# Patient Record
Sex: FEMALE | Race: WHITE | Employment: OTHER | URBAN - METROPOLITAN AREA
[De-identification: names, ages, dates, MRNs, and addresses within clinical notes are randomized per-mention and may not be internally consistent; named-entity substitution may affect disease eponyms.]

---

## 2018-12-19 PROCEDURE — 81001 URINALYSIS AUTO W/SCOPE: CPT | Performed by: INTERNAL MEDICINE

## 2019-02-16 ENCOUNTER — HOSPITAL ENCOUNTER (INPATIENT)
Facility: HOSPITAL | Age: 64
LOS: 1 days | Discharge: HOME | DRG: 983 | End: 2019-02-18
Attending: EMERGENCY MEDICINE | Admitting: INTERNAL MEDICINE
Payer: COMMERCIAL

## 2019-02-16 DIAGNOSIS — R04.0 EPISTAXIS: Primary | ICD-10-CM

## 2019-02-16 LAB
BASOPHILS # BLD AUTO: 0.04 X10(3) UL (ref 0–0.2)
BASOPHILS NFR BLD AUTO: 0.5 %
DEPRECATED RDW RBC AUTO: 43.5 FL (ref 35.1–46.3)
EOSINOPHIL # BLD AUTO: 0.08 X10(3) UL (ref 0–0.7)
EOSINOPHIL NFR BLD AUTO: 0.9 %
ERYTHROCYTE [DISTWIDTH] IN BLOOD BY AUTOMATED COUNT: 12.9 % (ref 11–15)
HCT VFR BLD AUTO: 36.6 % (ref 35–48)
HGB BLD-MCNC: 12.5 G/DL (ref 12–16)
IMM GRANULOCYTES # BLD AUTO: 0.02 X10(3) UL (ref 0–1)
IMM GRANULOCYTES NFR BLD: 0.2 %
LYMPHOCYTES # BLD AUTO: 1.5 X10(3) UL (ref 1–4)
LYMPHOCYTES NFR BLD AUTO: 17.2 %
MCH RBC QN AUTO: 31.3 PG (ref 26–34)
MCHC RBC AUTO-ENTMCNC: 34.2 G/DL (ref 31–37)
MCV RBC AUTO: 91.7 FL (ref 80–100)
MONOCYTES # BLD AUTO: 0.69 X10(3) UL (ref 0.1–1)
MONOCYTES NFR BLD AUTO: 7.9 %
NEUTROPHILS # BLD AUTO: 6.38 X10 (3) UL (ref 1.5–7.7)
NEUTROPHILS # BLD AUTO: 6.38 X10(3) UL (ref 1.5–7.7)
NEUTROPHILS NFR BLD AUTO: 73.3 %
PLATELET # BLD AUTO: 231 10(3)UL (ref 150–450)
RBC # BLD AUTO: 3.99 X10(6)UL (ref 3.8–5.3)
WBC # BLD AUTO: 8.7 X10(3) UL (ref 4–11)

## 2019-02-16 PROCEDURE — 2Y41X5Z PACKING OF NASAL REGION USING PACKING MATERIAL: ICD-10-PCS | Performed by: OTOLARYNGOLOGY

## 2019-02-16 RX ORDER — LORATADINE 10 MG/1
10 TABLET ORAL DAILY
COMMUNITY
End: 2019-11-20 | Stop reason: ALTCHOICE

## 2019-02-16 RX ORDER — LORAZEPAM 2 MG/ML
1 INJECTION INTRAMUSCULAR ONCE
Status: COMPLETED | OUTPATIENT
Start: 2019-02-16 | End: 2019-02-16

## 2019-02-16 RX ORDER — ONDANSETRON 4 MG/1
4 TABLET, ORALLY DISINTEGRATING ORAL ONCE
Status: DISCONTINUED | OUTPATIENT
Start: 2019-02-16 | End: 2019-02-18

## 2019-02-17 ENCOUNTER — ANESTHESIA (OUTPATIENT)
Dept: INTERVENTIONAL RADIOLOGY/VASCULAR | Facility: HOSPITAL | Age: 64
End: 2019-02-17

## 2019-02-17 ENCOUNTER — APPOINTMENT (OUTPATIENT)
Dept: INTERVENTIONAL RADIOLOGY/VASCULAR | Facility: HOSPITAL | Age: 64
DRG: 983 | End: 2019-02-17
Attending: RADIOLOGY
Payer: COMMERCIAL

## 2019-02-17 ENCOUNTER — ANESTHESIA EVENT (OUTPATIENT)
Dept: INTERVENTIONAL RADIOLOGY/VASCULAR | Facility: HOSPITAL | Age: 64
End: 2019-02-17

## 2019-02-17 LAB
ANION GAP SERPL CALC-SCNC: 7 MMOL/L (ref 0–18)
APTT PPP: 29.1 SECONDS (ref 26.1–34.6)
BASOPHILS # BLD AUTO: 0.02 X10(3) UL (ref 0–0.2)
BASOPHILS NFR BLD AUTO: 0.3 %
BUN BLD-MCNC: 19 MG/DL (ref 7–18)
BUN/CREAT SERPL: 22.4 (ref 10–20)
CALCIUM BLD-MCNC: 7.7 MG/DL (ref 8.5–10.1)
CHLORIDE SERPL-SCNC: 117 MMOL/L (ref 98–107)
CO2 SERPL-SCNC: 23 MMOL/L (ref 21–32)
CREAT BLD-MCNC: 0.85 MG/DL (ref 0.55–1.02)
DEPRECATED RDW RBC AUTO: 45.8 FL (ref 35.1–46.3)
EOSINOPHIL # BLD AUTO: 0 X10(3) UL (ref 0–0.7)
EOSINOPHIL NFR BLD AUTO: 0 %
ERYTHROCYTE [DISTWIDTH] IN BLOOD BY AUTOMATED COUNT: 13 % (ref 11–15)
GLUCOSE BLD-MCNC: 127 MG/DL (ref 70–99)
GLUCOSE BLD-MCNC: 143 MG/DL (ref 70–99)
HAV IGM SER QL: 1.9 MG/DL (ref 1.6–2.6)
HCT VFR BLD AUTO: 30.7 % (ref 35–48)
HGB BLD-MCNC: 9.5 G/DL (ref 12–16)
IMM GRANULOCYTES # BLD AUTO: 0.02 X10(3) UL (ref 0–1)
IMM GRANULOCYTES NFR BLD: 0.3 %
INR BLD: 1.27 (ref 0.9–1.1)
INR BLD: 1.34 (ref 0.9–1.1)
LYMPHOCYTES # BLD AUTO: 1.21 X10(3) UL (ref 1–4)
LYMPHOCYTES NFR BLD AUTO: 15.5 %
MCH RBC QN AUTO: 29.7 PG (ref 26–34)
MCHC RBC AUTO-ENTMCNC: 30.9 G/DL (ref 31–37)
MCV RBC AUTO: 95.9 FL (ref 80–100)
MONOCYTES # BLD AUTO: 0.57 X10(3) UL (ref 0.1–1)
MONOCYTES NFR BLD AUTO: 7.3 %
NEUTROPHILS # BLD AUTO: 6 X10 (3) UL (ref 1.5–7.7)
NEUTROPHILS # BLD AUTO: 6 X10(3) UL (ref 1.5–7.7)
NEUTROPHILS NFR BLD AUTO: 76.6 %
OSMOLALITY SERPL CALC.SUM OF ELEC: 309 MOSM/KG (ref 275–295)
PLATELET # BLD AUTO: 207 10(3)UL (ref 150–450)
POTASSIUM SERPL-SCNC: 4.2 MMOL/L (ref 3.5–5.1)
PSA SERPL DL<=0.01 NG/ML-MCNC: 16.4 SECONDS (ref 12.4–14.7)
PSA SERPL DL<=0.01 NG/ML-MCNC: 17.1 SECONDS (ref 12.4–14.7)
RBC # BLD AUTO: 3.2 X10(6)UL (ref 3.8–5.3)
SODIUM SERPL-SCNC: 147 MMOL/L (ref 136–145)
WBC # BLD AUTO: 7.8 X10(3) UL (ref 4–11)

## 2019-02-17 PROCEDURE — B31Q1ZZ FLUOROSCOPY OF CERVICO-CEREBRAL ARCH USING LOW OSMOLAR CONTRAST: ICD-10-PCS | Performed by: RADIOLOGY

## 2019-02-17 PROCEDURE — 99291 CRITICAL CARE FIRST HOUR: CPT | Performed by: OTHER

## 2019-02-17 PROCEDURE — 03LY3DZ OCCLUSION OF UPPER ARTERY WITH INTRALUMINAL DEVICE, PERCUTANEOUS APPROACH: ICD-10-PCS | Performed by: RADIOLOGY

## 2019-02-17 RX ORDER — ACETAMINOPHEN 325 MG/1
650 TABLET ORAL EVERY 4 HOURS PRN
Status: DISCONTINUED | OUTPATIENT
Start: 2019-02-17 | End: 2019-02-18

## 2019-02-17 RX ORDER — ONDANSETRON 2 MG/ML
4 INJECTION INTRAMUSCULAR; INTRAVENOUS EVERY 6 HOURS PRN
Status: DISCONTINUED | OUTPATIENT
Start: 2019-02-17 | End: 2019-02-18

## 2019-02-17 RX ORDER — DIPHENHYDRAMINE HYDROCHLORIDE 50 MG/ML
12.5 INJECTION INTRAMUSCULAR; INTRAVENOUS AS NEEDED
Status: DISCONTINUED | OUTPATIENT
Start: 2019-02-17 | End: 2019-02-17 | Stop reason: HOSPADM

## 2019-02-17 RX ORDER — METOCLOPRAMIDE HYDROCHLORIDE 5 MG/ML
10 INJECTION INTRAMUSCULAR; INTRAVENOUS AS NEEDED
Status: DISCONTINUED | OUTPATIENT
Start: 2019-02-17 | End: 2019-02-17 | Stop reason: HOSPADM

## 2019-02-17 RX ORDER — METOCLOPRAMIDE HYDROCHLORIDE 5 MG/ML
10 INJECTION INTRAMUSCULAR; INTRAVENOUS EVERY 6 HOURS PRN
Status: DISCONTINUED | OUTPATIENT
Start: 2019-02-17 | End: 2019-02-18

## 2019-02-17 RX ORDER — LIDOCAINE HYDROCHLORIDE 10 MG/ML
INJECTION, SOLUTION INFILTRATION; PERINEURAL
Status: COMPLETED
Start: 2019-02-17 | End: 2019-02-17

## 2019-02-17 RX ORDER — SCOLOPAMINE TRANSDERMAL SYSTEM 1 MG/1
1 PATCH, EXTENDED RELEASE TRANSDERMAL
Status: DISCONTINUED | OUTPATIENT
Start: 2019-02-17 | End: 2019-02-18

## 2019-02-17 RX ORDER — AMOXICILLIN AND CLAVULANATE POTASSIUM 875; 125 MG/1; MG/1
1 TABLET, FILM COATED ORAL EVERY 12 HOURS SCHEDULED
Status: DISCONTINUED | OUTPATIENT
Start: 2019-02-17 | End: 2019-02-18

## 2019-02-17 RX ORDER — NALOXONE HYDROCHLORIDE 0.4 MG/ML
80 INJECTION, SOLUTION INTRAMUSCULAR; INTRAVENOUS; SUBCUTANEOUS AS NEEDED
Status: DISCONTINUED | OUTPATIENT
Start: 2019-02-17 | End: 2019-02-17 | Stop reason: HOSPADM

## 2019-02-17 RX ORDER — HYDROCODONE BITARTRATE AND ACETAMINOPHEN 10; 325 MG/1; MG/1
1 TABLET ORAL EVERY 6 HOURS PRN
Status: DISCONTINUED | OUTPATIENT
Start: 2019-02-17 | End: 2019-02-18

## 2019-02-17 RX ORDER — LABETALOL HYDROCHLORIDE 5 MG/ML
5 INJECTION, SOLUTION INTRAVENOUS EVERY 5 MIN PRN
Status: DISCONTINUED | OUTPATIENT
Start: 2019-02-17 | End: 2019-02-17 | Stop reason: HOSPADM

## 2019-02-17 RX ORDER — PROTAMINE SULFATE 10 MG/ML
INJECTION, SOLUTION INTRAVENOUS
Status: COMPLETED
Start: 2019-02-17 | End: 2019-02-17

## 2019-02-17 RX ORDER — ACETAMINOPHEN 325 MG/1
650 TABLET ORAL EVERY 6 HOURS PRN
Status: DISCONTINUED | OUTPATIENT
Start: 2019-02-17 | End: 2019-02-17

## 2019-02-17 RX ORDER — VERAPAMIL HYDROCHLORIDE 2.5 MG/ML
INJECTION, SOLUTION INTRAVENOUS
Status: COMPLETED
Start: 2019-02-17 | End: 2019-02-17

## 2019-02-17 RX ORDER — HEPARIN SODIUM 5000 [USP'U]/ML
INJECTION, SOLUTION INTRAVENOUS; SUBCUTANEOUS
Status: COMPLETED
Start: 2019-02-17 | End: 2019-02-17

## 2019-02-17 RX ORDER — ONDANSETRON 2 MG/ML
4 INJECTION INTRAMUSCULAR; INTRAVENOUS AS NEEDED
Status: DISCONTINUED | OUTPATIENT
Start: 2019-02-17 | End: 2019-02-17 | Stop reason: HOSPADM

## 2019-02-17 RX ORDER — SODIUM CHLORIDE 9 MG/ML
INJECTION, SOLUTION INTRAVENOUS CONTINUOUS
Status: DISCONTINUED | OUTPATIENT
Start: 2019-02-17 | End: 2019-02-17

## 2019-02-17 RX ORDER — ACETAMINOPHEN 650 MG/1
650 SUPPOSITORY RECTAL EVERY 4 HOURS PRN
Status: DISCONTINUED | OUTPATIENT
Start: 2019-02-17 | End: 2019-02-18

## 2019-02-17 RX ORDER — MORPHINE SULFATE 4 MG/ML
1 INJECTION, SOLUTION INTRAMUSCULAR; INTRAVENOUS
Status: DISCONTINUED | OUTPATIENT
Start: 2019-02-17 | End: 2019-02-17

## 2019-02-17 RX ORDER — CETIRIZINE HYDROCHLORIDE 10 MG/1
5 TABLET ORAL DAILY
Status: DISCONTINUED | OUTPATIENT
Start: 2019-02-17 | End: 2019-02-18

## 2019-02-17 RX ORDER — LABETALOL HYDROCHLORIDE 5 MG/ML
10 INJECTION, SOLUTION INTRAVENOUS EVERY 10 MIN PRN
Status: DISCONTINUED | OUTPATIENT
Start: 2019-02-17 | End: 2019-02-18

## 2019-02-17 RX ORDER — MORPHINE SULFATE 4 MG/ML
1 INJECTION, SOLUTION INTRAMUSCULAR; INTRAVENOUS
Status: DISCONTINUED | OUTPATIENT
Start: 2019-02-17 | End: 2019-02-18

## 2019-02-17 RX ORDER — SODIUM CHLORIDE 9 MG/ML
INJECTION, SOLUTION INTRAVENOUS CONTINUOUS
Status: DISCONTINUED | OUTPATIENT
Start: 2019-02-17 | End: 2019-02-18

## 2019-02-17 RX ORDER — MORPHINE SULFATE 4 MG/ML
2 INJECTION, SOLUTION INTRAMUSCULAR; INTRAVENOUS
Status: DISCONTINUED | OUTPATIENT
Start: 2019-02-17 | End: 2019-02-18

## 2019-02-17 NOTE — PROGRESS NOTES
S: Pt developed recurrent bleeding post packing last night, underwent successful embolization with control of bilateral IM arteries but inability to embolize the facial artery.  This morning pt had some bloody post-nasal drainage that was serosanguinous in

## 2019-02-17 NOTE — ANESTHESIA PREPROCEDURE EVALUATION
PRE-OP EVALUATION    Patient Name: Meme Retana    Pre-op Diagnosis: * No pre-op diagnosis entered *    * No procedures listed *    * No surgeons found in log *    Pre-op vitals reviewed.   Temp: 98.5 °F (36.9 °C)  Pulse: 83  Resp: 16  BP: 117/78  SpO2: 12.5 02/16/2019    HGB 13.0 12/19/2018    HCT 36.6 02/16/2019    HCT 40.0 12/19/2018    MCV 91.7 02/16/2019    MCV 94.6 12/19/2018    MCH 31.3 02/16/2019    MCH 30.7 12/19/2018    MCHC 34.2 02/16/2019    MCHC 32.5 12/19/2018    RDW 12.9 02/16/2019    RDW 1

## 2019-02-17 NOTE — CONSULTS
[id number] Jenna Chawla Interventional Neuro Radiology Consult    Maryse Tolentino Patient Status:  Inpatient    10/27/1955 MRN [id number]   [facility] 6NE-A Attending Kacie Scherer MD   Hosp Day # 0 PCP Sun Gage MD     RE daily. Disp:  Rfl:  2/15/2019 at 0900   ergocalciferol 49249 units Oral Cap Take 1 capsule (50,000 Units total) by mouth once a week.  Disp: 12 capsule Rfl: 1 2/10/2019 at 2100   Calcium Carbonate-Vitamin D 250-125 MG-UNIT Oral Tab Take 1 tablet by mouth da HGB 9.5 02/17/2019    HCT 30.7 02/17/2019    .0 02/17/2019    CREATSERUM 0.85 02/17/2019    BUN 19 02/17/2019     02/17/2019    K 4.2 02/17/2019     02/17/2019    CO2 23.0 02/17/2019     02/17/2019    CA 7.7 02/17/2019    PTT 29. 1

## 2019-02-17 NOTE — ED NOTES
Neuro interventional team here to get patient. Attempted to call report to ICU. Pt daughters phone number on sticky note on chart. Sisters number in note in chart. Pt would like these added to her emergency contacts.

## 2019-02-17 NOTE — PLAN OF CARE
Patient brought up from [address] [place] [address] lab at [address] [place] [address]. Right groin assessed with NI RN. Right groin soft to palpate with no hematoma. Patient is drowsy, but easy to arouse. Patient denies SOB, nausea, headache, or pain. Bilateral nostrils with nasal packing.   POC discu

## 2019-02-17 NOTE — CONSULTS
[facility]  Neurocritical Care       Name: Zina Flores  MRN: [id number]  Admission Date/Time: 2/16/2019  6:24 PM  Primary Care Provider:  Luis Arreola MD        Reason for Consultation:   Epistaxsis    HPI:   Patient is a Highest education level: Not on file    Tobacco Use      Smoking status: Never Smoker      Smokeless tobacco: Never Used    Substance and Sexual Activity      Alcohol use: No        Alcohol/week: 0.0 oz      Drug use: No    Family History   Problem Relatio intake or output data in the 24 hours ending 02/17/19 0919    HEENT- NC/AT, Neck supple  Lungs - Clear  Heart - S1,S2    NEUROLOGICAL EXAMINATION:    Mental Awake, alert, oriented X3. Speech fluent.  Higher Functions Normal   Cranial Nerves # 2: Visual acui req  Skin:  - Monitor for skin breakdown.   DVT Prophylaxis:  - SCD’s  Goals of the Day: Continue to monitor in ICU for 24 hours post angiogram. NI and ENT on consult    A total of 35 minutes of critical care time (exclusive of billable procedures) was admi

## 2019-02-17 NOTE — ED NOTES
Pt arrives feeling scared. Nose clamp intact. Pt spitting out blood . Given suction which pt is using in the back of her throat continuously.

## 2019-02-17 NOTE — CONSULTS
Critical Care H&P/Consult     NAME: Jose Manley - ROOM: [facility] [place]- - MRN: [id number] - Age: 58year old - :  10/27/1955    Date of Admission: 2019  6:24 PM  Admission Diagnosis: Epistaxis [R04.0]      Assessment/Plan:  1.  Epistaxis - recurrent (ISOPTIN) 2.5 MG/ML injection      0.9%  NaCl infusion  Intravenous Continuous   [COMPLETED] Verapamil HCl (ISOPTIN) 2.5 MG/ML injection      [COMPLETED] iodixanol (VISIPAQUE) 320 MG/ML injection 150 mL 150 mL Intravenous ONCE PRN   [COMPLETED] Protamine S GLU  143*   BUN  19*   CREATSERUM  0.85   GFRAA  84   GFRNAA  73   CA  7.7*   NA  147*   K  4.2   CL  117*   CO2  23.0     No results for input(s): ABGPHT, AYPOZZ1C, YFJTB5Z, ABGHCO3, ABGBE, TEMP, RADHA, SITE, DEV, THGB in the last 168 hours.     Invalid

## 2019-02-17 NOTE — PLAN OF CARE
Problem: HEMATOLOGIC - ADULT  Goal: Maintains hematologic stability  INTERVENTIONS  - Assess for signs and symptoms of bleeding or hemorrhage  - Monitor labs and vital signs for trends  - Administer supportive blood products/factors, fluids and medications aware-normal. Increased headache/pressure as day progresses, especially with swallowing or coughing.  Additional pain meds orderedOne episode of emesis after having soup, emesis just looks like soup. zofran given, pt able to keep down yogurt and sherbert af

## 2019-02-17 NOTE — CONSULTS
58year old F c/o sudden R sided epistaxis that started around 7pm today after exercising. Notes intermittent R epistaxis in the past, no past procedures or packing placed. No recent trauma. No NSAIDs taken or other anticoagulants.  Underwent two prior att

## 2019-02-17 NOTE — ANESTHESIA POSTPROCEDURE EVALUATION
450 ABBIE Luong Patient Status:  Inpatient   Age/Gender 58year old female MRN [id number]   [facility] 6NE-A Attending Fco Trent, [address] [facility] Day # 0 PCP Kiran Mckeon MD       Anesthesia Post-op Note    * No

## 2019-02-17 NOTE — ED INITIAL ASSESSMENT (HPI)
Pt had sudden onset of bilat nares bleeding after she got home from working out. Nose has been bleeding less than 1 hour. Pt takes no blood thinners.

## 2019-02-17 NOTE — H&P
[facility] Hospitalist History and Physical      Patient presents with:  Nose Bleed (nasopharyngeal)       PCP: Willow Ivan MD      History of Present Illness: Patient is a 58year old female with PMH sig for kidney stones who presents to ER with nose No disorder of thought or mood. ENDOCRINE:  No heat or cold intolerance, polyuria or polydipsia.   HEMATOLOGICAL:  No easy bruising. +bleeding    OBJECTIVE:  /58   Pulse 76   Temp 99.4 °F (37.4 °C) (Temporal)   Resp 15   Ht 5' 7\" (1.702 m)   Wt 144 or previous hospital records reviewed. [facility] hospitalist to continue to follow patient while in house  A total of 70 minutes taken with patient and coordinating care. Greater than 50% face to face encounter.

## 2019-02-17 NOTE — PROCEDURES
[facility]  Pre-Procedural Note  Katrin Martinez Patient Status:  Emergency    10/27/1955 MRN [id number]   Location [address] [facility] Attending Carmenza Cruz MD   Hosp Day # 0 PCP Samira Gramajo MD     Procedure: Armand Marcus

## 2019-02-17 NOTE — ED PROVIDER NOTES
Patient Seen in: [facility] Emergency Department    History   Patient presents with:  Nose Bleed (nasopharyngeal)    Stated Complaint: epistaxis    HPI    79-year-old female presents with epistaxis.   She reports a chronic history of recurrent mild e she has bleeding from the right nostril that is too brisk to visualize any single source of bleeding. The posterior pharynx has moderate dripping of blood down the back of the throat. Sclerae anicteric. Conjunctivae show no pallor.   Oropharynx clear, mu radiology for cauterization. I discussed this case with interventional neurology who will come in to evaluate the patient for embolization. Admission changed to ICU.   [facility] pulmonary consulted  Admission disposition: 2/16/2019 11:39 PM                 Disp

## 2019-02-18 VITALS
SYSTOLIC BLOOD PRESSURE: 150 MMHG | HEIGHT: 67 IN | WEIGHT: 144.19 LBS | HEART RATE: 84 BPM | OXYGEN SATURATION: 97 % | DIASTOLIC BLOOD PRESSURE: 80 MMHG | RESPIRATION RATE: 14 BRPM | TEMPERATURE: 99 F | BODY MASS INDEX: 22.63 KG/M2

## 2019-02-18 LAB
ANION GAP SERPL CALC-SCNC: 4 MMOL/L (ref 0–18)
BASOPHILS # BLD AUTO: 0.03 X10(3) UL (ref 0–0.2)
BASOPHILS NFR BLD AUTO: 0.4 %
BUN BLD-MCNC: 13 MG/DL (ref 7–18)
BUN/CREAT SERPL: 26 (ref 10–20)
CALCIUM BLD-MCNC: 8.3 MG/DL (ref 8.5–10.1)
CHLORIDE SERPL-SCNC: 113 MMOL/L (ref 98–107)
CO2 SERPL-SCNC: 25 MMOL/L (ref 21–32)
CREAT BLD-MCNC: 0.5 MG/DL (ref 0.55–1.02)
DEPRECATED RDW RBC AUTO: 48.4 FL (ref 35.1–46.3)
EOSINOPHIL # BLD AUTO: 0.03 X10(3) UL (ref 0–0.7)
EOSINOPHIL NFR BLD AUTO: 0.4 %
ERYTHROCYTE [DISTWIDTH] IN BLOOD BY AUTOMATED COUNT: 13.8 % (ref 11–15)
GLUCOSE BLD-MCNC: 116 MG/DL (ref 70–99)
HCT VFR BLD AUTO: 28.3 % (ref 35–48)
HGB BLD-MCNC: 9.1 G/DL (ref 12–16)
IMM GRANULOCYTES # BLD AUTO: 0.02 X10(3) UL (ref 0–1)
IMM GRANULOCYTES NFR BLD: 0.3 %
ISTAT ACTIVATED CLOTTING TIME: 197 SECONDS (ref 74–137)
LYMPHOCYTES # BLD AUTO: 1.11 X10(3) UL (ref 1–4)
LYMPHOCYTES NFR BLD AUTO: 14.8 %
MCH RBC QN AUTO: 30.7 PG (ref 26–34)
MCHC RBC AUTO-ENTMCNC: 32.2 G/DL (ref 31–37)
MCV RBC AUTO: 95.6 FL (ref 80–100)
MONOCYTES # BLD AUTO: 0.56 X10(3) UL (ref 0.1–1)
MONOCYTES NFR BLD AUTO: 7.5 %
NEUTROPHILS # BLD AUTO: 5.74 X10 (3) UL (ref 1.5–7.7)
NEUTROPHILS # BLD AUTO: 5.74 X10(3) UL (ref 1.5–7.7)
NEUTROPHILS NFR BLD AUTO: 76.6 %
OSMOLALITY SERPL CALC.SUM OF ELEC: 295 MOSM/KG (ref 275–295)
PLATELET # BLD AUTO: 181 10(3)UL (ref 150–450)
POTASSIUM SERPL-SCNC: 3.7 MMOL/L (ref 3.5–5.1)
RBC # BLD AUTO: 2.96 X10(6)UL (ref 3.8–5.3)
SODIUM SERPL-SCNC: 142 MMOL/L (ref 136–145)
WBC # BLD AUTO: 7.5 X10(3) UL (ref 4–11)

## 2019-02-18 PROCEDURE — 99232 SBSQ HOSP IP/OBS MODERATE 35: CPT | Performed by: PHYSICIAN ASSISTANT

## 2019-02-18 PROCEDURE — 99233 SBSQ HOSP IP/OBS HIGH 50: CPT | Performed by: OTHER

## 2019-02-18 RX ORDER — POTASSIUM CHLORIDE 14.9 MG/ML
20 INJECTION INTRAVENOUS ONCE
Status: DISCONTINUED | OUTPATIENT
Start: 2019-02-18 | End: 2019-02-18

## 2019-02-18 RX ORDER — AMOXICILLIN AND CLAVULANATE POTASSIUM 500; 125 MG/1; MG/1
1 TABLET, FILM COATED ORAL 2 TIMES DAILY
Qty: 10 TABLET | Refills: 0 | Status: SHIPPED | OUTPATIENT
Start: 2019-02-18 | End: 2019-02-23

## 2019-02-18 RX ORDER — PROCHLORPERAZINE MALEATE 10 MG
10 TABLET ORAL EVERY 6 HOURS PRN
Qty: 20 TABLET | Refills: 0 | Status: SHIPPED | OUTPATIENT
Start: 2019-02-18 | End: 2019-11-20 | Stop reason: ALTCHOICE

## 2019-02-18 RX ORDER — HYDROMORPHONE HYDROCHLORIDE 2 MG/1
2 TABLET ORAL EVERY 4 HOURS PRN
Qty: 20 TABLET | Refills: 0 | Status: SHIPPED | OUTPATIENT
Start: 2019-02-18 | End: 2019-11-20 | Stop reason: ALTCHOICE

## 2019-02-18 RX ORDER — HYDROCODONE BITARTRATE AND ACETAMINOPHEN 5; 325 MG/1; MG/1
1 TABLET ORAL EVERY 4 HOURS PRN
Qty: 30 TABLET | Refills: 0 | Status: SHIPPED | OUTPATIENT
Start: 2019-02-18 | End: 2019-02-28

## 2019-02-18 NOTE — PAYOR COMM NOTE
--------------  ADMISSION REVIEW     Payor: Trula Fleischer POS/MANJINDER  Subscriber #:  [id number]  Authorization Number: [id number]    Admit date: 2/17/19  Admit time: 0606       Patient Seen in: [facility] Emergency Department    History   Patient presents w clear   Heart: regular rate rhythm and no murmur   Abdomen: Soft and nontender. No abdominal masses.   No peritoneal signs   Extremities: no edema, normal peripheral pulses   Neuro: Alert oriented and nonfocal   Skin: no rashes or nodules    [facility]   63-year- embolization procedure. This was successful and she denies further bleeding. She has packing in place. Some pain but improve with tylenol.      Past Medical History:   Diagnosis Date   • Calculus of kidney    • Colon polyps    • Kidney stones    • Menopause Neurologic: Moving all extremities spontaneously, no focal deficit appreciated     Data Review:    LABS:   Lab Results   Component Value Date    WBC 7.8 02/17/2019    HGB 9.5 02/17/2019    HCT 30.7 02/17/2019    .0 02/17/2019    CREATSERUM 0.85 02 2/18/2019 0400 Rate/Dose Verify (none) Intravenous     2/18/2019 0107 New Bag (none) Intravenous     2/18/2019 0000 Rate/Dose Verify (none) Intravenous     2/17/2019 1730 Rate/Dose Verify (none) Intravenous     2/17/2019 1456 New Bag (none) Intravenous

## 2019-02-18 NOTE — PROGRESS NOTES
Bertha Bentley Patient Status:  Inpatient    10/27/1955 MRN [id number]   [facility] 6NE-A Attending Edgar Moses, [address] [facility] Day # 1 PCP Charlestine Ormond, MD     Critical Care Progress Note      Assessment/Plan:  1.  Epistaxis - 113*   CO2  23.0  25.0     No results for input(s): ABGPHT, IXIKCI5E, ZTRAT3S, ABGHCO3, ABGBE, TEMP, RADHA, SITE, DEV, THGB in the last 168 hours. Invalid input(s): HCX55ESK, CHOB  No results for input(s): BNP in the last 168 hours.   No results for inpu

## 2019-02-18 NOTE — PLAN OF CARE
Remains neurologically intact. Lungs clear. C/o of throat dry and  Difficult breathing at time. O2 sat 97% and resp rate 13. Sat up in chair which seems to make her feel better. Medicated once for pain. C/O nausea and did vomit twice on night shift.

## 2019-02-18 NOTE — PROGRESS NOTES
[employer]  Neurocritical Care       Subjective: Anca Mobley is a(n) 58year old female epistaxis, s/p nasal packing and embolization by NI.     Review of Systems    Constitutional:    Denies unusual weight loss or weight gain, feve symmetric. 5/5 b/l 2+ b/l. Sensory: Normal and symmetric to light touch. Cerebellar: Finger-nose-finger intact. Gait: Deferred. Diagnostics:   No results found.     Lab Review     Lab Results   Component Value Date    WBC 7.5 02/18/2019    HGB 9.1 02/ cetirizine (ZYRTEC) tab 5 mg 5 mg Oral Daily   Metoclopramide HCl (REGLAN) injection 10 mg 10 mg Intravenous Q6H PRN   scopolamine (TRANSDERM-SCOP) patch 1 patch Transdermal Q72H   Prochlorperazine Edisylate (COMPAZINE) injection 10 mg 10 mg Intravenous

## 2019-02-18 NOTE — PLAN OF CARE
Pt. Refused oral and IV Potassium replacement. Back to bed. Kay MORAN informed that pt. Has been in a sinus arrhythmia and is asymtomatic. Pt. Resting comfortably.   Pt. States her resting heart rate is usually in the 50's and when she sleeps it ca

## 2019-02-18 NOTE — DISCHARGE SUMMARY
General Medicine Discharge Summary     Patient ID:  López Aldrich  58year old  10/27/1955    Admit date: 2/16/2019    Discharge date and time: 2/18/19    Attending Physician: IGNACIA Vargas (four) hours as needed for Pain. Prochlorperazine Maleate (COMPAZINE) 10 mg tablet  Take 1 tablet (10 mg total) by mouth every 6 (six) hours as needed for Nausea.     HYDROmorphone HCl 2 MG Oral Tab  Take 1 tablet (2 mg total) by mouth every 4 (four) samuel

## 2019-02-18 NOTE — PLAN OF CARE
Assumed patient care this am. Patient alert, oriented x4. Neurologically intact. No bleeding from nasal packing noted. Denies pain. Taking in adequate oral intake despite feeling uncomfortable while eating.  Up to the bathroom with standby assist, toleratin

## 2019-02-18 NOTE — PROGRESS NOTES
No problems overnight, tolerated pain control with morphine. On scopolamine and compazine for nausea. No headache now. VSS AF    Nose: R formal packing in place, L merocel in place  OC: no bleeding seen    A/P: R epistaxis, stable.    D/C to home on com

## 2019-02-18 NOTE — PROGRESS NOTES
[facility]  Interventional Neuroradiology Progress Note    Arin Hook Patient Status:  Inpatient    10/27/1955 MRN [id number]   [facility] 6NE-A Attending Pranav Moore, [address] [facility] [address] Day # 1 PCP MD Sharath William 02/18/2019    CA 8.3 02/18/2019    PGLU 127 02/17/2019       Imaging: no new neuro imaging        Assessment:  Refractory epistaxis of bilateral nares, unresponsive to nasal packing, s/p embolization of Bilateral IMAXAs with successful embolization      Pl

## 2019-02-18 NOTE — DISCHARGE SUMMARY
Pt admitted on 2/16/19 for severe R epistaxis, underwent multiple attempts at packing including formal anterior packing on the R and merocel packing on the left. Recurrent bleeding occurred and pt underwent embolization with ultimate control of bleeding.

## 2019-02-18 NOTE — PLAN OF CARE
HEMATOLOGIC - ADULT    • Maintains hematologic stability Progressing    • Free from bleeding injury Progressing        NEUROLOGICAL - ADULT    • Achieves stable or improved neurological status Progressing        Patient/Family Goals    • Patient/Family Hood

## 2019-03-04 ENCOUNTER — TELEPHONE (OUTPATIENT)
Dept: SURGERY | Facility: CLINIC | Age: 64
End: 2019-03-04

## 2019-03-04 NOTE — TELEPHONE ENCOUNTER
Pt arrived for appt on 03/04 then refused to be seen; pt did not want her insurance billed stating appt should be covered under hospital visit

## 2019-03-04 NOTE — TELEPHONE ENCOUNTER
Called patient to inform her that I talked with Arnold Aviles of billing, who indicated to me that the procedure that Dr. Lou Arnold did while she was in hospital does not have a postop associated with it. Any hospital fup would be billed to insurance.

## 2019-11-05 PROBLEM — Z80.0 FAMILY HISTORY OF MALIGNANT NEOPLASM OF DIGESTIVE ORGAN: Status: ACTIVE | Noted: 2019-11-05

## 2019-11-05 PROBLEM — D12.0 BENIGN NEOPLASM OF CECUM: Status: ACTIVE | Noted: 2019-11-05

## 2019-11-05 PROBLEM — Z86.010 PERSONAL HISTORY OF COLONIC POLYPS: Status: ACTIVE | Noted: 2019-11-05

## 2019-11-05 PROBLEM — Z83.71 FAMILY HISTORY OF COLONIC POLYPS: Status: ACTIVE | Noted: 2019-11-05

## 2021-01-06 PROBLEM — R03.0 WHITE COAT SYNDROME WITH HIGH BLOOD PRESSURE WITHOUT HYPERTENSION: Status: ACTIVE | Noted: 2021-01-06

## 2022-11-09 NOTE — PROCEDURES
[facility]  Neurointerventional Surgery Operative Report  Bertha Bentley Patient Status:  Emergency    10/27/1955 MRN [id number]   [facility] No att. providers found   Hosp Day # 0 PCP Evelio Garcia epistaxis.     PROCEDURE DATE: 2/17/19     : Dr Cindi Urbina, Interventional Neuroradiology / Neurointerventional Surgery     INDICATIONS:    Right sided epistaxis repeatedly despite ENT measures.  .     PROCEDURES  PERFORMED:     Micropuncture normal saline 1200 cc  Total fluoroscopy time 43 mins  Total Air kerma 719 mGy  IA verapamil 20 mg  IV Heparin 4,000 units  Estimated blood loss 10cc     SEDATION: The patient received general anesthesia as administered by the anesthesiology team who monit to the anterior temporal branch origin. The facial artery was too tortuous to attempt a distal embolization.      I then performed the same sequence of carotid angiography and distal internal maxillary embolization on the left side, this time using a quarte palatine branch is also opacified downstream. The infraorbital artery and anterior deep temporal branches are opacified by reflux along the internal maxillary artery.     Repeat angiography of the right internal maxillary artery demonstrates no residual sp branches. The left ICA territory is unchanged.     Roadmap view of the right common femoral artery shows the puncture site located at the distal common femoral segment, projecting over the femoral head.  There is no femoral artery stenosis and the appearanc done

## 2023-04-26 PROBLEM — M81.0 AGE-RELATED OSTEOPOROSIS WITHOUT CURRENT PATHOLOGICAL FRACTURE: Status: ACTIVE | Noted: 2023-01-11

## 2023-04-26 RX ORDER — LISINOPRIL 5 MG/1
5 TABLET ORAL DAILY
COMMUNITY
Start: 2023-04-17

## 2023-05-15 ENCOUNTER — ANESTHESIA EVENT (OUTPATIENT)
Dept: ENDOSCOPY | Facility: HOSPITAL | Age: 68
End: 2023-05-15
Payer: MEDICARE

## 2023-05-15 ENCOUNTER — ANESTHESIA (OUTPATIENT)
Dept: ENDOSCOPY | Facility: HOSPITAL | Age: 68
End: 2023-05-15
Payer: MEDICARE

## 2023-05-15 ENCOUNTER — HOSPITAL ENCOUNTER (OUTPATIENT)
Facility: HOSPITAL | Age: 68
Setting detail: HOSPITAL OUTPATIENT SURGERY
Discharge: HOME | End: 2023-05-15
Attending: INTERNAL MEDICINE | Admitting: INTERNAL MEDICINE
Payer: MEDICARE

## 2023-05-15 VITALS
RESPIRATION RATE: 16 BRPM | DIASTOLIC BLOOD PRESSURE: 83 MMHG | SYSTOLIC BLOOD PRESSURE: 137 MMHG | OXYGEN SATURATION: 99 % | HEART RATE: 56 BPM | WEIGHT: 140 LBS | BODY MASS INDEX: 21.97 KG/M2 | HEIGHT: 67 IN | TEMPERATURE: 98 F

## 2023-05-15 PROCEDURE — 0DBN8ZX EXCISION OF SIGMOID COLON, VIA NATURAL OR ARTIFICIAL OPENING ENDOSCOPIC, DIAGNOSTIC: ICD-10-PCS | Performed by: INTERNAL MEDICINE

## 2023-05-15 PROCEDURE — 88305 TISSUE EXAM BY PATHOLOGIST: CPT | Performed by: INTERNAL MEDICINE

## 2023-05-15 RX ORDER — LIDOCAINE HYDROCHLORIDE 10 MG/ML
INJECTION, SOLUTION EPIDURAL; INFILTRATION; INTRACAUDAL; PERINEURAL AS NEEDED
Status: DISCONTINUED | OUTPATIENT
Start: 2023-05-15 | End: 2023-05-15 | Stop reason: SURG

## 2023-05-15 RX ORDER — SODIUM CHLORIDE, SODIUM LACTATE, POTASSIUM CHLORIDE, CALCIUM CHLORIDE 600; 310; 30; 20 MG/100ML; MG/100ML; MG/100ML; MG/100ML
INJECTION, SOLUTION INTRAVENOUS CONTINUOUS
Status: DISCONTINUED | OUTPATIENT
Start: 2023-05-15 | End: 2023-05-15

## 2023-05-15 RX ORDER — ONDANSETRON 2 MG/ML
4 INJECTION INTRAMUSCULAR; INTRAVENOUS AS NEEDED
Status: DISCONTINUED | OUTPATIENT
Start: 2023-05-15 | End: 2023-05-15

## 2023-05-15 RX ORDER — NALOXONE HYDROCHLORIDE 0.4 MG/ML
80 INJECTION, SOLUTION INTRAMUSCULAR; INTRAVENOUS; SUBCUTANEOUS AS NEEDED
Status: DISCONTINUED | OUTPATIENT
Start: 2023-05-15 | End: 2023-05-15

## 2023-05-15 RX ADMIN — LIDOCAINE HYDROCHLORIDE 30 MG: 10 INJECTION, SOLUTION EPIDURAL; INFILTRATION; INTRACAUDAL; PERINEURAL at 07:34:00

## 2023-05-15 RX ADMIN — SODIUM CHLORIDE, SODIUM LACTATE, POTASSIUM CHLORIDE, CALCIUM CHLORIDE: 600; 310; 30; 20 INJECTION, SOLUTION INTRAVENOUS at 07:31:00

## 2023-05-15 RX ADMIN — SODIUM CHLORIDE, SODIUM LACTATE, POTASSIUM CHLORIDE, CALCIUM CHLORIDE: 600; 310; 30; 20 INJECTION, SOLUTION INTRAVENOUS at 07:56:00

## 2023-05-15 NOTE — ANESTHESIA POSTPROCEDURE EVALUATION
Analilia Luong Patient Status:  Hospital Outpatient Surgery   Age/Gender 75year old female MRN [id number]   Location [address] [address] Attending Abbie Leary MD   [facility] Day # 0 PCP Chip Hester MD       Anesthesia Post-op Note    COLONOSCOPY with forceps polypectomy    Procedure Summary     Date: 05/15/23 Room / Location: [facility] ENDOSCOPY 03 / [facility] ENDOSCOPY    Anesthesia Start: 6677 Anesthesia Stop: Bulah Small    Procedure: COLONOSCOPY with forceps polypectomy Diagnosis: (polyp)    Surgeons: Abbie Leary MD Anesthesiologist: Mari Santos MD    Anesthesia Type: MAC ASA Status: 2          Anesthesia Type: MAC    Vitals Value Taken Time   /75 05/15/23 0757   Temp  05/15/23 0757   Pulse 65 05/15/23 0757   Resp 16 05/15/23 0757   SpO2 100 05/15/23 0757       Patient Location: Endoscopy    Anesthesia Type: MAC    Airway Patency: patent    Postop Pain Control: adequate    Mental Status: mildly sedated but able to meaningfully participate in the post-anesthesia evaluation    Nausea/Vomiting: none    Cardiopulmonary/Hydration status: stable euvolemic    Complications: no apparent anesthesia related complications    Postop vital signs: stable    Dental Exam: Unchanged from Postop

## 2023-05-15 NOTE — DISCHARGE INSTRUCTIONS
ENDOSCOPY DISCHARGE INSTRUCTIONS    Procedure Performed:   Colonoscopy    Endoscopist: No name on file  FINDINGS:   Internal hemorrhoids and Colon polyp (a growth in the colon)    MEDICATIONS:  You may resume all other medications today    DIET:  Resume Normal Diet    BIOPSIES:  Biopsy results will be released to you as soon as they are available as is now the law. This will not allow your physician the opportunity to go over these before they are released to you. It is not necessary to contact the office for explanation of these results. Your physician will contact you within a few business days of their release to review the findings with you    X-RAYS/LABS:   No X-rays/Labs were ordered today    ADDITIONAL RECOMMENDATIONS:        Activity for remainder of today:    REST TODAY  DO NOT drive or operate heavy machinery  DO NOT drink any alcoholic beverages  DO NOT sign any legal documents or make any important decisions    After your procedure(s): It is not unusual to feel bloated or gassy . Passing gas and belching is encouraged. Lying on your left side with your knees flexed may relieve the discomfort. A hot pack to the abdomen may also help. After your gastroscopy (upper endoscopy): You may experience a slight sore throat which will subside. Throat lozenges or salt water gargle can be used.     FOLLOW-UP:  Contact the office at [phone] for follow-up appointment is needed or if you develop any of the following:    Severe abdominal pain/discomfort     Excessive bleeding                     Black tarry stool    Difficulty breathing/swallowing      Persistent nausea/vomiting  Fever above 100 degrees or chills

## 2023-05-15 NOTE — OPERATIVE REPORT
Colonoscopy Operative Report    aRfa Young Patient Status:  [facility]    10/27/1955 MRN [id number]   Location [address] [place] [address] Attending Reinier Dalton MD   Hosp Day #   0 PCP Maurizio Catalan MD     Pre-Operative Diagnosis: HISTORY OF COLON POLYPS, FAMILY HISTORY OF COLONIC POLYS    Post-Operative Diagnosis:  One 3 mm sigmoid polyp resected with biopsy forceps  Small internal hemorrhoids    Procedure Performed: Procedure(s):  COLONOSCOPY with forceps polypectomy     Informed Consent: Informed consent for both the procedure and sedation were obtained from the patient. The potentially life-threatening complications of sedation, bleeding,  perforation, transfusion or repeat endoscopy  were reviewed along with the possible need for hospitalization, surgical management, transfusion or repeat endoscopy should one of these complications arise. The patient understands and is agreeable to proceed. Sedation Type: MAC-Patient received sedation with monitored anesthesia provided by an anesthesiologist      Cecum Withdrawal Time:  6 minutes  Date of previous colonoscopy:     Procedure Description: The patient was placed in the left lateral decubitus position. After careful digital rectal examination, the Pediatric colonoscope was inserted into the rectum and advanced to the level of the cecum under direct visualization. The cecum was identified by landmarks, including the appendiceal orifice and ileoceccal valve. Careful examination of the entire colon was performed during withdrawal of the endoscope. The scope was withdrawn to the rectum and retroflexion was performed. The patient tolerated the procedure well with no immediate complications. The patient was transferred to the recovery area in stable condition.   Quality of Preparation: Adequate  Aronchick Bowel Prep Scale:  good  Findings:   One 3 mm sigmoid polyp resected with biopsy forceps  Small internal hemorrhoids    Recommendations: await pathology  Discharge: The patient was given an after visit summary detailing the procedure, findings, recommendations and follow up plans.      Boy Escoto MD  5/15/2023  7:54 AM

## (undated) DEVICE — ENDOSCOPY PACK - LOWER: Brand: MEDLINE INDUSTRIES, INC.

## (undated) DEVICE — FORCEP BIOPSY RJ4 LG CAP W/ND

## (undated) DEVICE — 10FT COMBINED O2 DELIVERY/CO2 MONITORING. FILTER WITH MICROSTREAM TYPE LUER: Brand: DUAL ADULT NASAL CANNULA

## (undated) DEVICE — BIOGUARD CLEANING ADAPTER

## (undated) DEVICE — 1200CC GUARDIAN II: Brand: GUARDIAN

## (undated) DEVICE — KIT ENDO ORCAPOD 160/180/190

## (undated) DEVICE — 3M™ RED DOT™ MONITORING ELECTRODE WITH FOAM TAPE AND STICKY GEL, 50/BAG, 20/CASE, 72/PLT 2570: Brand: RED DOT™

## (undated) NOTE — LETTER
[facility] [address], [address] [place] [address]    Consent for Anesthesia   1.   INona agree to be cared for by an anesthesiologist, who is specially trained to monitor me and give me medicine to put me to sleep or keep me comfort vision, nerves, or muscles and in extremely rare instances death. 5. My doctor has explained to me other choices available to me for my care (alternatives).   6. Pregnant Patients (“epidural”):  I understand that the risks of having an epidural (medicine g